# Patient Record
Sex: MALE | Race: WHITE | NOT HISPANIC OR LATINO | URBAN - METROPOLITAN AREA
[De-identification: names, ages, dates, MRNs, and addresses within clinical notes are randomized per-mention and may not be internally consistent; named-entity substitution may affect disease eponyms.]

---

## 2020-12-15 ENCOUNTER — TELEPHONE ENCOUNTER (OUTPATIENT)
Dept: URBAN - METROPOLITAN AREA CLINIC 35 | Facility: CLINIC | Age: 60
End: 2020-12-15

## 2020-12-15 ENCOUNTER — OFFICE VISIT (OUTPATIENT)
Dept: URBAN - METROPOLITAN AREA CLINIC 33 | Facility: CLINIC | Age: 60
End: 2020-12-15

## 2020-12-15 VITALS
HEIGHT: 71 IN | HEART RATE: 64 BPM | OXYGEN SATURATION: 97 % | DIASTOLIC BLOOD PRESSURE: 82 MMHG | BODY MASS INDEX: 38.92 KG/M2 | SYSTOLIC BLOOD PRESSURE: 135 MMHG | WEIGHT: 278 LBS

## 2020-12-15 RX ORDER — SODIUM SULFATE, POTASSIUM SULFATE, MAGNESIUM SULFATE 17.5; 3.13; 1.6 G/ML; G/ML; G/ML
AS DIRECTED SOLUTION, CONCENTRATE ORAL
Qty: 1 | Refills: 0 | OUTPATIENT
Start: 2020-12-15

## 2020-12-15 RX ORDER — B-COMPLEX WITH VITAMIN C
AS DIRECTED TABLET ORAL
Status: ACTIVE | COMMUNITY

## 2020-12-15 RX ORDER — MAGNESIUM HYDROXIDE 400 MG/5ML
AS DIRECTED SUSPENSION, ORAL (FINAL DOSE FORM) ORAL
Status: ACTIVE | COMMUNITY

## 2020-12-15 RX ORDER — ERGOCALCIFEROL 1.25 MG/1
TK 1 C PO ONCE WEEKLY CAPSULE, LIQUID FILLED ORAL
Qty: 13 UNSPECIFIED | Refills: 0 | Status: ACTIVE | COMMUNITY

## 2020-12-15 RX ORDER — LEVOTHYROXINE SODIUM 200 UG/1
TK 1 T PO QD UTD TABLET ORAL
Qty: 90 UNSPECIFIED | Refills: 1 | Status: ACTIVE | COMMUNITY

## 2020-12-15 NOTE — HPI-MIGRATED HPI
;     Colorectal Cancer Screening : Patient is a 60-year-old  male who presents today for a colorectal cancer screening. Patient's last colonoscopy performed on 2011 with Dr. Tena revealed tubular adenoma x1, hyperplastic polyp x2. Patient denies a family history of colon cancer/polyps and gastric/esophageal cancer/polyps. Patient currently admits 1 bowel movement a day. Stools are typically formed but very soft and sticky. Patient denies melena, blood, or mucus in stool, heartburn, nausea, vomiting, diarrhea, or constipation. ;

## 2020-12-18 ENCOUNTER — OFFICE VISIT (OUTPATIENT)
Dept: URBAN - METROPOLITAN AREA SURGERY CENTER 8 | Facility: SURGERY CENTER | Age: 60
End: 2020-12-18

## 2020-12-18 RX ORDER — LEVOTHYROXINE SODIUM 200 UG/1
TK 1 T PO QD UTD TABLET ORAL
Qty: 90 UNSPECIFIED | Refills: 1 | Status: ACTIVE | COMMUNITY

## 2020-12-18 RX ORDER — B-COMPLEX WITH VITAMIN C
AS DIRECTED TABLET ORAL
Status: ACTIVE | COMMUNITY

## 2020-12-18 RX ORDER — ERGOCALCIFEROL 1.25 MG/1
TK 1 C PO ONCE WEEKLY CAPSULE, LIQUID FILLED ORAL
Qty: 13 UNSPECIFIED | Refills: 0 | Status: ACTIVE | COMMUNITY

## 2020-12-18 RX ORDER — SODIUM SULFATE, POTASSIUM SULFATE, MAGNESIUM SULFATE 17.5; 3.13; 1.6 G/ML; G/ML; G/ML
AS DIRECTED SOLUTION, CONCENTRATE ORAL
Qty: 1 | Refills: 0 | Status: ACTIVE | COMMUNITY
Start: 2020-12-15

## 2020-12-18 RX ORDER — MAGNESIUM HYDROXIDE 400 MG/5ML
AS DIRECTED SUSPENSION, ORAL (FINAL DOSE FORM) ORAL
Status: ACTIVE | COMMUNITY

## 2020-12-23 ENCOUNTER — DASHBOARD ENCOUNTERS (OUTPATIENT)
Age: 60
End: 2020-12-23

## 2020-12-29 ENCOUNTER — OFFICE VISIT (OUTPATIENT)
Dept: URBAN - METROPOLITAN AREA CLINIC 33 | Facility: CLINIC | Age: 60
End: 2020-12-29

## 2020-12-29 RX ORDER — MAGNESIUM HYDROXIDE 400 MG/5ML
AS DIRECTED SUSPENSION, ORAL (FINAL DOSE FORM) ORAL
COMMUNITY

## 2020-12-29 RX ORDER — LEVOTHYROXINE SODIUM 200 UG/1
TK 1 T PO QD UTD TABLET ORAL
Qty: 90 UNSPECIFIED | Refills: 1 | COMMUNITY

## 2020-12-29 RX ORDER — ERGOCALCIFEROL 1.25 MG/1
TK 1 C PO ONCE WEEKLY CAPSULE, LIQUID FILLED ORAL
Qty: 13 UNSPECIFIED | Refills: 0 | COMMUNITY

## 2020-12-29 RX ORDER — B-COMPLEX WITH VITAMIN C
AS DIRECTED TABLET ORAL
COMMUNITY

## 2020-12-29 RX ORDER — SODIUM SULFATE, POTASSIUM SULFATE, MAGNESIUM SULFATE 17.5; 3.13; 1.6 G/ML; G/ML; G/ML
AS DIRECTED SOLUTION, CONCENTRATE ORAL
Qty: 1 | Refills: 0 | COMMUNITY
Start: 2020-12-15

## 2020-12-29 NOTE — HPI-MIGRATED HPI
;   ;     Surveillance Colonoscopy : 61 y/o male patient presents today for a consultation for a surveillance colonoscopy. Patient currently admits __ bowel movements per day with no melena, mucus  or blood in stools.  Patient denies/admits abdominal pain, bloating, gas, or  heartburn.  Patient admits having a colonoscopy in --- by  ---- with --  findings.   Patient admits a family hx of colon, gastric, or esophageal cancer/polyps. ;   Colorectal Cancer Screening :    Last visit (12/15/20) Patient is a 60-year-old  male who presents today for a colorectal cancer screening. Patient's last colonoscopy performed on 2011 with Dr. Tena revealed tubular adenoma x1, hyperplastic polyp x2. Patient denies a family history of colon cancer/polyps and gastric/esophageal cancer/polyps. Patient currently admits 1 bowel movement a day. Stools are typically formed but very soft and sticky. Patient denies melena, blood, or mucus in stool, heartburn, nausea, vomiting, diarrhea, or constipation. ;